# Patient Record
Sex: MALE | Race: WHITE | NOT HISPANIC OR LATINO | Employment: UNEMPLOYED | URBAN - METROPOLITAN AREA
[De-identification: names, ages, dates, MRNs, and addresses within clinical notes are randomized per-mention and may not be internally consistent; named-entity substitution may affect disease eponyms.]

---

## 2017-07-25 ENCOUNTER — HOSPITAL ENCOUNTER (EMERGENCY)
Facility: HOSPITAL | Age: 52
Discharge: HOME | End: 2017-07-25
Attending: EMERGENCY MEDICINE | Admitting: EMERGENCY MEDICINE

## 2017-07-25 VITALS
WEIGHT: 140 LBS | BODY MASS INDEX: 21.22 KG/M2 | DIASTOLIC BLOOD PRESSURE: 82 MMHG | RESPIRATION RATE: 18 BRPM | OXYGEN SATURATION: 98 % | SYSTOLIC BLOOD PRESSURE: 114 MMHG | TEMPERATURE: 98.7 F | HEIGHT: 68 IN | HEART RATE: 80 BPM

## 2017-07-25 DIAGNOSIS — F22 DELUSIONS OF PARASITOSIS (HCC): Primary | ICD-10-CM

## 2017-07-25 LAB
AMPHET+METHAMPHET UR QL: POSITIVE
BARBITURATES UR QL SCN: NEGATIVE
BENZODIAZ UR QL SCN: POSITIVE
CANNABINOIDS SERPL QL: NEGATIVE
COCAINE UR QL: NEGATIVE
ETHANOL BLD-MCNC: <10 MG/DL (ref 0–10)
ETHANOL UR QL: <0.01 %
METHADONE UR QL SCN: NEGATIVE
OPIATES UR QL: NEGATIVE
OXYCODONE UR QL SCN: NEGATIVE

## 2017-07-25 PROCEDURE — 80307 DRUG TEST PRSMV CHEM ANLYZR: CPT | Performed by: EMERGENCY MEDICINE

## 2017-07-25 PROCEDURE — 90791 PSYCH DIAGNOSTIC EVALUATION: CPT | Performed by: SOCIAL WORKER

## 2017-07-25 PROCEDURE — 99284 EMERGENCY DEPT VISIT MOD MDM: CPT

## 2017-07-25 NOTE — ED PROVIDER NOTES
1900  Care was turned over to me by Dr. Upton, who medically cleared the pt and was awaiting ACCESS consult. Scout (ACCESS) will refer pt to [facility] and I will refer a PCP. Pt is stable and in NAD, and will be discharged home.    1930  Checked pt. Notified of plan to discharge home. Pt agrees with plan and all questions are addressed.     I supervised care provided by the midlevel provider.    We have discussed this patient's history, physical exam, and treatment plan.   I have reviewed the note and personally saw and examined the patient and agree with the plan of care.    Documentation assistance provided by north Rose for Dr. Navarrete.  Information recorded by the scrnick was done at my direction and has been verified and validated by me.     Devora Rose  07/25/17 1901       Devora Rose  07/25/17 1930       Rogelio Navarrete MD  07/26/17 0001

## 2017-07-25 NOTE — CONSULTS
51 y/o cauc  father of 3 children coming to the ED w/ his wife.  Patient states he had been in senior care for ETOH related issues and breaking his parole.  After getting out of the post senior care half way house he found that his house that had not been lived in for approximately 1 year had bed bugs.  Per patient and wife who was in the interview w/ pt's permission the bed bugs were gotten rid of.  Patient reports he has had bugs coming out of his nose, his rectum and has turned the inside of his nose black.  He came to day to find out what they are and how to deal w/ this. Patient states that they usually don't leave any marks but occasionally a very small bite tammy. He reports poor sleep  Appetite is good.  No SI of HI.   He uses meth/ amphetamine occasionally. He reports only one xanax in last year.  UDS was positive for benzos and amphetamine/methamphetamine.      Patient has had prior psych admissions at [facility] and at [facility].  He reports that he has had SI once and was treated here.  He and wife had broken up then.  He denies any SI of HI now.  Patient's chart indicates that he has been diagnosed w/ BiPolar D/o and depression.  He states that he was seeing a therapist at [facility] during his stay in the half way house and was discharged for missing an appointment.  He reports that he had been on Neurontin and it has helped him in the past.  He was not able to get a prescription after the [facility] stopped him.      Patient is currently doing odd jobs.  He and wife live together.  He goes to  occasionally.       Patient is alert and O X 4.  He has seen these bugs and feels these bugs.  No SI of HI.  Patient does not believe that this is psychiatric issue.  He is open to being on Neurontin.    Discussed w/ Dr. Navarrete.  Patient provided number to [facility] of mental health and BRIAN tx.

## 2017-07-25 NOTE — ED PROVIDER NOTES
EMERGENCY DEPARTMENT ENCOUNTER    CHIEF COMPLAINT  Chief Complaint: Itching  History given by: Patient and family  History limited by: Nothing  Room Number: 10/10  PMD: No Known Provider      HPI:  Pt is a 52 y.o. male who presents complaining of itching onset 2 months ago. The pt states he has parasites crawling out of his rectum that cause itchiness. The pt states he can wash them off in the shower, but he can't see them when he is out of the shower. The pt states the parasites get in his eyes, eyebrows, and mouth. The pt states they look like tiny black insects. The pt states he has been using a body cleanse drink he got from [facility], but it is not helping his symptoms. Pt denies fever, chills, nausea, vomiting, visual hallucinations, and auditory hallucinations. Pt reports a hx of meth use, but states he has not taken anything in 5 days.     The pt's family states the pt was recently in skilled nursing and was diagnosed with schizophrenia.    Duration: 2 months  Onset: Gradual  Timing: Constant  Radiation: None  Quality: Itchiness  Intensity/Severity: Moderate  Progression: Unchanged  Associated Symptoms: Nothing  Aggravating Factors: Nothing  Alleviating Factors: Nothing  Previous Episodes: None  Treatment before arrival: Body cleanse    PAST MEDICAL HISTORY  Active Ambulatory Problems     Diagnosis Date Noted   • No Active Ambulatory Problems     Resolved Ambulatory Problems     Diagnosis Date Noted   • No Resolved Ambulatory Problems     Past Medical History:   Diagnosis Date   • Schizophrenia        PAST SURGICAL HISTORY  Past Surgical History:   Procedure Laterality Date   • HERNIA REPAIR         FAMILY HISTORY  History reviewed. No pertinent family history.    SOCIAL HISTORY  Social History     Social History   • Marital status:      Spouse name: N/A   • Number of children: N/A   • Years of education: N/A     Occupational History   • Not on file.     Social History Main Topics   • Smoking status: Former Smoker      Quit date: 4/1/2017   • Smokeless tobacco: Not on file      Comment: quit 3 months ago after over 40 years smoking < 1ppd   • Alcohol use 4.2 oz/week     7 Shots of liquor per week      Comment: occational drinker    • Drug use: 1.00 per week     Special: Methamphetamines      Comment: reports not using in 5 days    • Sexual activity: Yes     Partners: Female     Other Topics Concern   • Not on file     Social History Narrative   • No narrative on file       ALLERGIES  Review of patient's allergies indicates no known allergies.    REVIEW OF SYSTEMS  Review of Systems   Constitutional: Negative for activity change, appetite change and fever.   HENT: Negative for congestion and sore throat.    Eyes: Negative.    Respiratory: Negative for cough and shortness of breath.    Cardiovascular: Negative for chest pain and leg swelling.   Gastrointestinal: Negative for abdominal pain, diarrhea and vomiting.   Endocrine: Negative.    Genitourinary: Negative for decreased urine volume and dysuria.   Musculoskeletal: Negative for neck pain.   Skin: Negative for rash and wound.        Itchiness from parasites that originate from his rectum   Allergic/Immunologic: Negative.    Neurological: Negative for weakness, numbness and headaches.   Hematological: Negative.    Psychiatric/Behavioral: Negative.    All other systems reviewed and are negative.      PHYSICAL EXAM  ED Triage Vitals   Temp Heart Rate Resp BP SpO2   07/25/17 1136 07/25/17 1136 07/25/17 1137 07/25/17 1145 07/25/17 1136   98.7 °F (37.1 °C) 73 16 116/83 97 %      Temp src Heart Rate Source Patient Position BP Location FiO2 (%)   07/25/17 1136 -- -- -- --   Tympanic           Physical Exam   Constitutional: He is oriented to person, place, and time and well-developed, well-nourished, and in no distress.   HENT:   Head: Normocephalic and atraumatic.   Eyes: EOM are normal.   Neck: Normal range of motion.   Cardiovascular: Normal rate and regular rhythm.     Pulmonary/Chest: Effort normal and breath sounds normal. No respiratory distress.   Musculoskeletal: Normal range of motion.   Neurological: He is alert and oriented to person, place, and time. He has normal sensation and normal strength.   Skin: Skin is warm and dry. No rash noted.   The pt has no lesions or parasites seen on his skin.   Psychiatric: Affect normal.   Nursing note and vitals reviewed.      LAB RESULTS  Lab Results (last 24 hours)     Procedure Component Value Units Date/Time    Ethanol [[id number]] Collected:  07/25/17 1229    Specimen:  Blood Updated:  07/25/17 1254     Ethanol <10 mg/dL      Ethanol % <0.010 %     Urine Drug Screen [[id number]]  (Abnormal) Collected:  07/25/17 1402    Specimen:  Urine from Urine, Clean Catch Updated:  07/25/17 1451     Amphet/Methamphet, Screen Positive (A)     Barbiturates Screen, Urine Negative     Benzodiazepine Screen, Urine Positive (A)     Cocaine Screen, Urine Negative     Opiate Screen Negative     THC, Screen, Urine Negative     Methadone Screen, Urine Negative     Oxycodone Screen, Urine Negative    Narrative:       Negative Thresholds For Drugs Screened:     Amphetamines               500 ng/ml   Barbiturates               200 ng/ml   Benzodiazepines            100 ng/ml   Cocaine                    300 ng/ml   Methadone                  300 ng/ml   Opiates                    300 ng/ml   Oxycodone                  100 ng/ml   THC                        50 ng/ml    The Normal Value for all drugs tested is negative. This report includes final unconfirmed screening results to be used for medical treatment purposes only. Unconfirmed results must not be used for non-medical purposes such as employment or legal testing. Clinical consideration should be applied to any drug of abuse test, particulary when unconfirmed results are used.          I ordered the above labs and reviewed the results     PROCEDURES  Procedures      PROGRESS AND CONSULTS  ED Course      1225  Labs ordered for further evaluation. Consult placed to Access.    1504  BP- 114/82 HR- 78 Temp- 98.7 °F (37.1 °C) (Tympanic) O2 sat- 99%    Rechecked pt, he is resting comfortably. The pt states he has not been seen by [facility] yet.    1506  I discussed the pt's case with Dr. Navarrete and he agreed to assume care for the pt pending an evaluation by [facility].    MEDICAL DECISION MAKING  Results were reviewed/discussed with the patient and they were also made aware of online access. Pt also made aware that some labs, such as cultures, will not be resulted during ER visit and follow up with PMD is necessary.     MDM  Number of Diagnoses or Management Options  Diagnosis management comments: Patient presented to the ER complaining of seeing parasites crawling out of his body for the past 2 months.  According to his significant other, he was recently diagnosed with schizophrenia but was not started on any medications.  On exam, there were no skin lesions or rash or visible parasites seen.  Patient states he uses methamphetamine regularly but states he has not used it and the past several days.  Toxin was positive for amphetamines and benzodiazepines.  Patient's care was turned over to Dr. Navarrete.  Patient is awaiting access evaluation.  He denies suicidal ideation.  I suspect that the patient will likely be discharged       Amount and/or Complexity of Data Reviewed  Clinical lab tests: ordered and reviewed (ETOH - Negative  Meth - Positive  Benzo - Positive)  Review and summarize past medical records: yes (The pt was admitted to the [facility] in August 2015 for depression and SI.)    Patient Progress  Patient progress: stable         DIAGNOSIS  Final diagnoses:   Delusions of parasitosis       DISPOSITION  The pt's care was turned over to Dr. Navarrete pending an evaluation by [facility].    Latest Documented Vital Signs:  As of 9:07 AM  BP- 114/82 HR- 80 Temp- 98.7 °F (37.1 °C) (Tympanic) O2 sat- 98%    --  Documentation  assistance provided by north Hernandez for Dr. Upton.  Information recorded by the scribe was done at my direction and has been verified and validated by me.       David Hernandez  07/25/17 1505       Gurpreet Upton MD  07/26/17 0980

## 2017-07-25 NOTE — PROGRESS NOTES
Discharge Planning Assessment  [facility]     Patient Name: Pedro Luis Landin  MRN: [id number]  Today's Date: 7/25/2017    Admit Date: 7/25/2017          Discharge Needs Assessment     None            Discharge Plan       07/25/17 1925    Case Management/Social Work Plan    Additional Comments Pt does not have a PCP. Advised to call insurance co to obtain list of available providers. Clinic list given to pt        Discharge Placement     No information found                Demographic Summary     None            Functional Status     None            Psychosocial     None            Abuse/Neglect     None            Legal     None            Substance Abuse     None            Patient Forms       07/25/17 1926    Patient Forms    Provider Choice List Delivered   Clinic list    Delivered to Patient    Method of delivery In person          Ginette Nelson RN

## 2018-02-21 ENCOUNTER — APPOINTMENT (OUTPATIENT)
Dept: CT IMAGING | Facility: HOSPITAL | Age: 53
End: 2018-02-21

## 2018-02-21 ENCOUNTER — HOSPITAL ENCOUNTER (EMERGENCY)
Facility: HOSPITAL | Age: 53
Discharge: LEFT BEFORE DISCHARGE | End: 2018-02-21

## 2018-02-21 VITALS
BODY MASS INDEX: 22.73 KG/M2 | HEIGHT: 68 IN | WEIGHT: 150 LBS | RESPIRATION RATE: 17 BRPM | DIASTOLIC BLOOD PRESSURE: 94 MMHG | HEART RATE: 111 BPM | SYSTOLIC BLOOD PRESSURE: 150 MMHG | TEMPERATURE: 97.5 F | OXYGEN SATURATION: 96 %

## 2018-02-21 PROCEDURE — 70480 CT ORBIT/EAR/FOSSA W/O DYE: CPT

## 2018-02-21 PROCEDURE — 70450 CT HEAD/BRAIN W/O DYE: CPT

## 2018-02-21 PROCEDURE — 99211 OFF/OP EST MAY X REQ PHY/QHP: CPT
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
## 2019-04-09 ENCOUNTER — HOSPITAL ENCOUNTER (EMERGENCY)
Facility: HOSPITAL | Age: 54
Discharge: HOME | End: 2019-04-09
Attending: EMERGENCY MEDICINE | Admitting: EMERGENCY MEDICINE

## 2019-04-09 VITALS
DIASTOLIC BLOOD PRESSURE: 82 MMHG | BODY MASS INDEX: 18.53 KG/M2 | RESPIRATION RATE: 18 BRPM | WEIGHT: 125.1 LBS | HEART RATE: 84 BPM | OXYGEN SATURATION: 92 % | SYSTOLIC BLOOD PRESSURE: 124 MMHG | TEMPERATURE: 95 F | HEIGHT: 69 IN

## 2019-04-09 DIAGNOSIS — T50.901A ACCIDENTAL OVERDOSE, INITIAL ENCOUNTER: Primary | ICD-10-CM

## 2019-04-09 LAB
APAP SERPL-MCNC: <5 MCG/ML (ref 10–30)
ETHANOL BLD-MCNC: <10 MG/DL (ref 0–10)
ETHANOL UR QL: <0.01 %
SALICYLATES SERPL-MCNC: <0.3 MG/DL

## 2019-04-09 PROCEDURE — 80307 DRUG TEST PRSMV CHEM ANLYZR: CPT | Performed by: PHYSICIAN ASSISTANT

## 2019-04-09 PROCEDURE — 99285 EMERGENCY DEPT VISIT HI MDM: CPT

## 2019-04-09 NOTE — ED NOTES
Pt asleep at this time. Appears in NAD. Will continue to monitor. Urinal placed at bedside.      Elaine Marshall RN  04/09/19 9745

## 2019-04-09 NOTE — ED PROVIDER NOTES
EMERGENCY DEPARTMENT ENCOUNTER    Room number:  29/29  Date Seen:  4/9/2019  Time of transfer:6:00AM  PCP:  Provider, No Known    Laboratory Results:  Recent Results (from the past 24 hour(s))   Salicylate Level    Collection Time: 04/09/19  5:07 AM   Result Value Ref Range    Salicylate <0.3 <=30.0 mg/dL   Acetaminophen Level    Collection Time: 04/09/19  5:07 AM   Result Value Ref Range    Acetaminophen <5.0 (L) 10.0 - 30.0 mcg/mL   Ethanol    Collection Time: 04/09/19  5:07 AM   Result Value Ref Range    Ethanol <10 0 - 10 mg/dL    Ethanol % <0.010 %     I reviewed the above results.    Medications ordered in ED:  Medications - No data to display    Progress and Consult Notes:  6:00AM:  Patient care transferred from Harjinder Linares PA-C pending disposition.    8:05 AM  Reviewed pt's history and workup with Dr. Berumen.  After a bedside evaluation; Dr Berumen agrees with the plan of care.     8:42 AM  Rechecked pt, he was sleeping but easily arouses. Pt states he is ready to go home and will call for a ride. Pt will be discharged. Pt understands and agrees with the plan, all questions answered.    DISCHARGE  Discussed all results and noted any abnormalities with patient.  Discussed absoute need to recheck abnormalities with PCP    Reviewed implications of results, diagnosis, meds, responsibility to follow up, warning signs and symptoms of possible worsening, potential complications and reasons to return to ER with patient    Discussed plan for discharge, as there is no emergent indication for admission.  Pt is agreeable and understands need for follow up and repeat testing.  Pt is aware that discharge does not mean that nothing is wrong but it indicates no emergency is present and they must continue care with PCP.  Pt is discharged with instructions to follow up with primary care doctor to have their blood pressure rechecked.       DIAGNOSIS  Final diagnoses:   Accidental overdose, initial encounter       FOLLOW UP    PATIENT [facility] [place] [address]  [phone]  Schedule an appointment as soon as possible for a visit         I personally reviewed the past medical history, past surgical history, social history, family history, current medications and allergies as they appear in this chart.  The scribe's note accurately reflects the work and decisions made by me.     Provider attestation:  I personally reviewed the past medical history, past surgical history, social history, family history, current medications, and allergies as they appear in the chart.    The patient was seen and examined by myself and Harjinder Linares PA-C, who agree with plan.     Documentation assistance provided by north Daniels for PEG Ta.  Information recorded by the scribleydi was done at my direction and has been verified and validated by me.     Shelli Daniels  04/09/19 6310       Lilo Benson APRN  04/09/19 6084

## 2019-04-09 NOTE — ED NOTES
"Pt given paper scrubs to wear at this time due to pt reporting \"my clothes are all wet\".      Elaine Marshall RN  04/09/19 8407    "
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
## 2019-04-09 NOTE — ED NOTES
Pt given phone and drink per KAMRON Hernandes. Pt quickly falls asleep after verbal stimulation.     Elaine Marshall, RN  04/09/19 0900

## 2019-04-09 NOTE — ED NOTES
"Pt brought to ED via EMS for suspected opiate OD at his home. EMS states pt was unresponsive when they arrive on scene, but woke up when they administered 0.5mg naloxone. Pt states \"I thought I was taking a percocet. I don't really know for sure.\" He denies SI/HI, states \"I just wanted the pain to stop.\" Pt arrived w/ SPO2 84% on room air. O2 applied via nasal cannula at 3L/min. Pt SPO2 now 91% on 3L. He is alert and oriented x 4, somnolent but responsive to questions. Vitals stable. NAD noted at this time.      Thania Ron, RN  04/09/19 5213    "
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
## 2019-04-09 NOTE — ED NOTES
Pt advised urine specimen is needed. Pt states he is unable to urinate at this time. Pt advised to alert the nurse when able to urinate. Call light within reach. Will continue to monitor.      Thania Ron RN  04/09/19 0529

## 2019-04-09 NOTE — ED NOTES
This ERT dialed two numbers for pt with no answer. Pt falls asleep while waiting for phone to ring and falls asleep while talking to me. Wakes easily. Pt states he will wait in the lobby and his wife will come get him if needed. Pt falls back to sleep.     Arvind Garrett  04/09/19 0963

## 2020-04-13 ENCOUNTER — NURSE TRIAGE (OUTPATIENT)
Dept: CALL CENTER | Facility: HOSPITAL | Age: 55
End: 2020-04-13

## 2020-04-13 NOTE — TELEPHONE ENCOUNTER
"Reviewed guideline with caller, advises MR. Landin be seen in ED for Evaluation of injury. CAller agrees to follow care advice.     Reason for Disposition  • Can't move injured shoulder at all    Additional Information  • Negative: Passed out (i.e., lost consciousness, collapsed and was not responding)  • Negative: Shock suspected (e.g., cold/pale/clammy skin, too weak to stand, low BP, rapid pulse)  • Negative: [1] Similar pain previously AND [2] it was from \"heart attack\"  • Negative: [1] Similar pain previously AND [2] it was from \"angina\" AND [3] not relieved by nitroglycerin  • Negative: Sounds like a life-threatening emergency to the triager  • Followed a shoulder injury  • Negative: Serious injury with multiple fractures  • Negative: [1] Major bleeding (e.g., actively dripping or spurting) AND [2] can't be stopped  • Negative: Bullet wound, stabbed by knife, or other serious penetrating wound  • Negative: Sounds like a life-threatening emergency to the triager  • Negative: Wound looks infected  • Negative: Shoulder pain from overuse (work, exercise, gardening) OR from self-induced lifting injury  • Negative: Shoulder pain not from an injury  • Negative: Looks like a broken bone (crooked or deformed)  • Negative: Looks like a dislocated joint    Answer Assessment - Initial Assessment Questions  1. ONSET: \"When did the pain start?\"     2 days ago   2. LOCATION: \"Where is the pain located?\"      Left shoulder  3. PAIN: \"How bad is the pain?\" (Scale 1-10; or mild, moderate, severe)    - MILD (1-3): doesn't interfere with normal activities    - MODERATE (4-7): interferes with normal activities (e.g., work or school) or awakens from sleep    - SEVERE (8-10): excruciating pain, unable to do any normal activities, unable to move arm at all due to pain      10./10  4. WORK OR EXERCISE: \"Has there been any recent work or exercise that involved this part of the body?\"      Fell off a step stool   5. CAUSE: \"What do you " "think is causing the shoulder pain?\"      injury  6. OTHER SYMPTOMS: \"Do you have any other symptoms?\" (e.g., neck pain, swelling, rash, fever, numbness, weakness)      A little swollen  7. PREGNANCY: \"Is there any chance you are pregnant?\" \"When was your last menstrual period?\"      na    Answer Assessment - Initial Assessment Questions  1. MECHANISM: \"How did the injury happen?\"      Fell off of a stepstool  2. ONSET: \"When did the injury happen?\" (Minutes or hours ago)       2 days ago  3. APPEARANCE of INJURY: \"What does the injury look like?\"       swollen  4. SEVERITY: \"Can you move the shoulder normally?\"       no  5. SIZE: For cuts, bruises, or swelling, ask: \"How large is it?\" (e.g., inches or centimeters;  entire joint)       A little bit swollen   6. PAIN: \"Is there pain?\" If so, ask: \"How bad is the pain?\"   (e.g., Scale 1-10; or mild, moderate, severe)      10/10  7. TETANUS: For any breaks in the skin, ask: \"When was the last tetanus booster?\"      na  8. OTHER SYMPTOMS: \"Do you have any other symptoms?\" (e.g., loss of sensation)      numbness  9. PREGNANCY: \"Is there any chance you are pregnant?\" \"When was your last menstrual period?\"      na    Protocols used: SHOULDER INJURY-ADULT-, SHOULDER PAIN-ADULT-      "

## 2021-11-17 ENCOUNTER — HOSPITAL ENCOUNTER (EMERGENCY)
Facility: HOSPITAL | Age: 56
Discharge: HOME | End: 2021-11-17
Attending: EMERGENCY MEDICINE | Admitting: EMERGENCY MEDICINE

## 2021-11-17 ENCOUNTER — APPOINTMENT (OUTPATIENT)
Dept: GENERAL RADIOLOGY | Facility: HOSPITAL | Age: 56
End: 2021-11-17

## 2021-11-17 VITALS
HEIGHT: 68 IN | HEART RATE: 54 BPM | OXYGEN SATURATION: 97 % | WEIGHT: 141 LBS | DIASTOLIC BLOOD PRESSURE: 78 MMHG | SYSTOLIC BLOOD PRESSURE: 116 MMHG | BODY MASS INDEX: 21.37 KG/M2 | RESPIRATION RATE: 18 BRPM | TEMPERATURE: 96.9 F

## 2021-11-17 DIAGNOSIS — R40.4 TRANSIENT ALTERATION OF AWARENESS: ICD-10-CM

## 2021-11-17 DIAGNOSIS — T42.4X1A: Primary | ICD-10-CM

## 2021-11-17 LAB
ALBUMIN SERPL-MCNC: 3.8 G/DL (ref 3.5–5.2)
ALBUMIN/GLOB SERPL: 1.2 G/DL
ALP SERPL-CCNC: 94 U/L (ref 39–117)
ALT SERPL W P-5'-P-CCNC: 48 U/L (ref 1–41)
ANION GAP SERPL CALCULATED.3IONS-SCNC: 7.1 MMOL/L (ref 5–15)
AST SERPL-CCNC: 41 U/L (ref 1–40)
BASOPHILS # BLD AUTO: 0.04 10*3/MM3 (ref 0–0.2)
BASOPHILS NFR BLD AUTO: 0.6 % (ref 0–1.5)
BILIRUB SERPL-MCNC: 0.3 MG/DL (ref 0–1.2)
BUN SERPL-MCNC: 17 MG/DL (ref 6–20)
BUN/CREAT SERPL: 20.2 (ref 7–25)
CALCIUM SPEC-SCNC: 8.5 MG/DL (ref 8.6–10.5)
CHLORIDE SERPL-SCNC: 103 MMOL/L (ref 98–107)
CO2 SERPL-SCNC: 28.9 MMOL/L (ref 22–29)
CREAT SERPL-MCNC: 0.84 MG/DL (ref 0.76–1.27)
DEPRECATED RDW RBC AUTO: 40.5 FL (ref 37–54)
EOSINOPHIL # BLD AUTO: 0.45 10*3/MM3 (ref 0–0.4)
EOSINOPHIL NFR BLD AUTO: 6.4 % (ref 0.3–6.2)
ERYTHROCYTE [DISTWIDTH] IN BLOOD BY AUTOMATED COUNT: 12.4 % (ref 12.3–15.4)
ETHANOL BLD-MCNC: <10 MG/DL (ref 0–10)
ETHANOL UR QL: <0.01 %
GFR SERPL CREATININE-BSD FRML MDRD: 94 ML/MIN/1.73
GLOBULIN UR ELPH-MCNC: 3.3 GM/DL
GLUCOSE SERPL-MCNC: 92 MG/DL (ref 65–99)
HCT VFR BLD AUTO: 37.9 % (ref 37.5–51)
HGB BLD-MCNC: 12.8 G/DL (ref 13–17.7)
HOLD SPECIMEN: NORMAL
HOLD SPECIMEN: NORMAL
IMM GRANULOCYTES # BLD AUTO: 0.02 10*3/MM3 (ref 0–0.05)
IMM GRANULOCYTES NFR BLD AUTO: 0.3 % (ref 0–0.5)
LYMPHOCYTES # BLD AUTO: 2.28 10*3/MM3 (ref 0.7–3.1)
LYMPHOCYTES NFR BLD AUTO: 32.7 % (ref 19.6–45.3)
MAGNESIUM SERPL-MCNC: 2.1 MG/DL (ref 1.6–2.6)
MCH RBC QN AUTO: 30.3 PG (ref 26.6–33)
MCHC RBC AUTO-ENTMCNC: 33.8 G/DL (ref 31.5–35.7)
MCV RBC AUTO: 89.8 FL (ref 79–97)
MONOCYTES # BLD AUTO: 0.81 10*3/MM3 (ref 0.1–0.9)
MONOCYTES NFR BLD AUTO: 11.6 % (ref 5–12)
NEUTROPHILS NFR BLD AUTO: 3.38 10*3/MM3 (ref 1.7–7)
NEUTROPHILS NFR BLD AUTO: 48.4 % (ref 42.7–76)
NRBC BLD AUTO-RTO: 0 /100 WBC (ref 0–0.2)
PLATELET # BLD AUTO: 230 10*3/MM3 (ref 140–450)
PMV BLD AUTO: 10.8 FL (ref 6–12)
POTASSIUM SERPL-SCNC: 4.6 MMOL/L (ref 3.5–5.2)
PROT SERPL-MCNC: 7.1 G/DL (ref 6–8.5)
QT INTERVAL: 428 MS
RBC # BLD AUTO: 4.22 10*6/MM3 (ref 4.14–5.8)
SODIUM SERPL-SCNC: 139 MMOL/L (ref 136–145)
TROPONIN T SERPL-MCNC: <0.01 NG/ML (ref 0–0.03)
WBC NRBC COR # BLD: 6.98 10*3/MM3 (ref 3.4–10.8)
WHOLE BLOOD HOLD SPECIMEN: NORMAL
WHOLE BLOOD HOLD SPECIMEN: NORMAL

## 2021-11-17 PROCEDURE — 93010 ELECTROCARDIOGRAM REPORT: CPT | Performed by: INTERNAL MEDICINE

## 2021-11-17 PROCEDURE — 83735 ASSAY OF MAGNESIUM: CPT | Performed by: EMERGENCY MEDICINE

## 2021-11-17 PROCEDURE — 82077 ASSAY SPEC XCP UR&BREATH IA: CPT | Performed by: EMERGENCY MEDICINE

## 2021-11-17 PROCEDURE — 71045 X-RAY EXAM CHEST 1 VIEW: CPT

## 2021-11-17 PROCEDURE — 85025 COMPLETE CBC W/AUTO DIFF WBC: CPT

## 2021-11-17 PROCEDURE — 84484 ASSAY OF TROPONIN QUANT: CPT | Performed by: EMERGENCY MEDICINE

## 2021-11-17 PROCEDURE — 99284 EMERGENCY DEPT VISIT MOD MDM: CPT

## 2021-11-17 PROCEDURE — 80053 COMPREHEN METABOLIC PANEL: CPT | Performed by: EMERGENCY MEDICINE

## 2021-11-17 PROCEDURE — 93005 ELECTROCARDIOGRAM TRACING: CPT

## 2021-11-17 PROCEDURE — 93005 ELECTROCARDIOGRAM TRACING: CPT | Performed by: EMERGENCY MEDICINE

## 2021-11-17 RX ORDER — SODIUM CHLORIDE 0.9 % (FLUSH) 0.9 %
10 SYRINGE (ML) INJECTION AS NEEDED
Status: DISCONTINUED | OUTPATIENT
Start: 2021-11-17 | End: 2021-11-17 | Stop reason: HOSPADM

## 2021-11-17 RX ORDER — METHADONE HYDROCHLORIDE 10 MG/1
35 TABLET ORAL DAILY
COMMUNITY
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
## 2021-11-17 NOTE — DISCHARGE INSTRUCTIONS
Please do not use Valium unless prescribed.  I suspect your episode today was related to using too high dose of Valium.  Dangers of Valium overdose include death and stroke.

## 2021-11-17 NOTE — ED NOTES
Pt resting with eyes closed. resp even and unlabored. Pt arouses to verbal stimulation     Janice Damon, RN  11/17/21 6865
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
## 2024-08-19 NOTE — ED TRIAGE NOTES
"Pt states that he \"feels like he has parasites crawling out of his rectum.\" He states that they get in his eyes and his throat causing great discomfort. Pt states that he typically uses meth approx 1x per week but has not used in 2 weeks because he is on parol.    " [phone]